# Patient Record
Sex: MALE | Race: ASIAN | NOT HISPANIC OR LATINO | Employment: FULL TIME | URBAN - METROPOLITAN AREA
[De-identification: names, ages, dates, MRNs, and addresses within clinical notes are randomized per-mention and may not be internally consistent; named-entity substitution may affect disease eponyms.]

---

## 2024-10-02 PROBLEM — E53.8 B12 DEFICIENCY: Status: ACTIVE | Noted: 2024-10-02

## 2024-10-02 PROBLEM — E11.9 TYPE 2 DIABETES MELLITUS WITHOUT COMPLICATION, WITHOUT LONG-TERM CURRENT USE OF INSULIN (HCC): Status: ACTIVE | Noted: 2024-10-02

## 2024-10-02 PROBLEM — M25.562 ACUTE PAIN OF LEFT KNEE: Status: ACTIVE | Noted: 2024-10-02

## 2024-10-22 ENCOUNTER — OFFICE VISIT (OUTPATIENT)
Dept: INTERNAL MEDICINE CLINIC | Facility: CLINIC | Age: 54
End: 2024-10-22
Payer: COMMERCIAL

## 2024-10-22 VITALS
OXYGEN SATURATION: 97 % | HEART RATE: 70 BPM | WEIGHT: 176 LBS | SYSTOLIC BLOOD PRESSURE: 116 MMHG | DIASTOLIC BLOOD PRESSURE: 72 MMHG | HEIGHT: 69 IN | BODY MASS INDEX: 26.07 KG/M2 | TEMPERATURE: 98.2 F

## 2024-10-22 DIAGNOSIS — Z23 ENCOUNTER FOR IMMUNIZATION: ICD-10-CM

## 2024-10-22 DIAGNOSIS — Z12.11 SCREENING FOR COLON CANCER: ICD-10-CM

## 2024-10-22 DIAGNOSIS — E53.8 B12 DEFICIENCY: ICD-10-CM

## 2024-10-22 DIAGNOSIS — E78.5 HYPERLIPIDEMIA, UNSPECIFIED HYPERLIPIDEMIA TYPE: ICD-10-CM

## 2024-10-22 DIAGNOSIS — E11.9 TYPE 2 DIABETES MELLITUS WITHOUT COMPLICATION, WITHOUT LONG-TERM CURRENT USE OF INSULIN (HCC): Primary | ICD-10-CM

## 2024-10-22 PROCEDURE — 90673 RIV3 VACCINE NO PRESERV IM: CPT

## 2024-10-22 PROCEDURE — 99214 OFFICE O/P EST MOD 30 MIN: CPT | Performed by: INTERNAL MEDICINE

## 2024-10-22 PROCEDURE — 90471 IMMUNIZATION ADMIN: CPT

## 2024-10-22 NOTE — PROGRESS NOTES
Assessment/Plan:           1. Type 2 diabetes mellitus without complication, without long-term current use of insulin (HCC)  Comments:  Continue metformin 500 mg 2 times daily.  Orders:  -     CBC and differential; Future; Expected date: 04/02/2025  -     Comprehensive metabolic panel; Future; Expected date: 04/02/2025  -     Albumin / creatinine urine ratio; Future; Expected date: 04/02/2025  -     Hemoglobin A1C; Future; Expected date: 04/02/2025  -     Urinalysis with microscopic; Future; Expected date: 04/02/2025  -     metFORMIN (GLUCOPHAGE) 500 mg tablet; Take 1 tablet (500 mg total) by mouth 2 (two) times a day with meals  2. Screening for colon cancer  -     Ambulatory Referral to Gastroenterology; Future  3. Encounter for immunization  -     influenza vaccine, recombinant, PF, 0.5 mL IM (Flublok)  4. B12 deficiency  Comments:  Advised to continue supplementation and increased to 5 times weekly.  5. Hyperlipidemia, unspecified hyperlipidemia type  Comments:  Continue atorvastatin 20 mg daily.  Orders:  -     Lipid panel; Future; Expected date: 04/02/2025         1. Screening for colon cancer    - Ambulatory Referral to Gastroenterology; Future    2. Encounter for immunization    - influenza vaccine, recombinant, PF, 0.5 mL IM (Flublok)       No problem-specific Assessment & Plan notes found for this encounter.           Subjective:      Patient ID: Gordon Joe is a 53 y.o. male.    HPI    The following portions of the patient's history were reviewed and updated as appropriate: He  has a past medical history of Diabetes mellitus (HCC).  He   Patient Active Problem List    Diagnosis Date Noted    Hyperlipidemia 10/02/2024    Type 2 diabetes mellitus without complication, without long-term current use of insulin (HCC) 10/02/2024    Palpitations 10/02/2024    BPH associated with nocturia 10/02/2024    Abnormal EKG 10/02/2024    Acute pain of left knee 10/02/2024    B12 deficiency 10/02/2024     He  has a past  surgical history that includes [place] tooth extraction (Bilateral).  His family history includes Diabetes in his brother, mother, sister, and sister; No Known Problems in his father.  He  reports that he has never smoked. He has never used smokeless tobacco. He reports that he does not drink alcohol and does not use drugs.  Current Outpatient Medications   Medication Sig Dispense Refill    atorvastatin (LIPITOR) 20 mg tablet Take 1 tablet (20 mg total) by mouth daily 90 tablet 1    metFORMIN (GLUCOPHAGE) 500 mg tablet Take 1 tablet (500 mg total) by mouth 2 (two) times a day with meals 180 tablet 1     No current facility-administered medications for this visit.     Current Outpatient Medications on File Prior to Visit   Medication Sig    atorvastatin (LIPITOR) 20 mg tablet Take 1 tablet (20 mg total) by mouth daily    [DISCONTINUED] metFORMIN (GLUCOPHAGE) 500 mg tablet Take 500 mg by mouth 2 (two) times a day with meals     No current facility-administered medications on file prior to visit.     Medications Discontinued During This Encounter   Medication Reason    metFORMIN (GLUCOPHAGE) 500 mg tablet Reorder      He has No Known Allergies..    Review of Systems   Constitutional:  Negative for appetite change, chills, fatigue and fever.   HENT:  Negative for sore throat and trouble swallowing.    Eyes:  Negative for redness.   Respiratory:  Negative for shortness of breath.    Cardiovascular:  Negative for chest pain and palpitations.   Gastrointestinal:  Negative for abdominal pain, constipation and diarrhea.   Genitourinary:  Negative for dysuria and hematuria.   Musculoskeletal:  Negative for back pain and neck pain.   Skin:  Negative for rash.   Neurological:  Negative for seizures, weakness and headaches.   Hematological:  Negative for adenopathy.   Psychiatric/Behavioral:  Negative for confusion. The patient is not nervous/anxious.          Objective:      /72 (BP Location: Left arm, Patient Position:  "Sitting, Cuff Size: Standard)   Pulse 70   Temp 98.2 °F (36.8 °C) (Temporal)   Ht 5' 9\" (1.753 m)   Wt 79.8 kg (176 lb)   SpO2 97%   BMI 25.99 kg/m²     Results Reviewed       None            Recent Results (from the past 1344 hour(s))   Montrellruby Ann Default    Collection Time: 10/03/24 12:24 PM   Result Value Ref Range    White Blood Cell Count 7.6 3.4 - 10.8 x10E3/uL    Red Blood Cell Count 4.70 4.14 - 5.80 x10E6/uL    Hemoglobin 13.8 13.0 - 17.7 g/dL    HCT 43.2 37.5 - 51.0 %    MCV 92 79 - 97 fL    MCH 29.4 26.6 - 33.0 pg    MCHC 31.9 31.5 - 35.7 g/dL    RDW 12.7 11.6 - 15.4 %    Platelet Count 257 150 - 450 x10E3/uL    Neutrophils 57 Not Estab. %    Lymphocytes 32 Not Estab. %    Monocytes 9 Not Estab. %    Eosinophils 1 Not Estab. %    Basophils PCT 1 Not Estab. %    Neutrophils (Absolute) 4.4 1.4 - 7.0 x10E3/uL    Lymphocytes (Absolute) 2.4 0.7 - 3.1 x10E3/uL    Monocytes (Absolute) 0.7 0.1 - 0.9 x10E3/uL    Eosinophils (Absolute) 0.1 0.0 - 0.4 x10E3/uL    Basophils ABS 0.0 0.0 - 0.2 x10E3/uL    Immature Granulocytes 0 Not Estab. %    Immature Granulocytes (Absolute) 0.0 0.0 - 0.1 x10E3/uL   Comprehensive metabolic panel    Collection Time: 10/03/24 12:24 PM   Result Value Ref Range    Glucose, Random 109 (H) 70 - 99 mg/dL    BUN 10 6 - 24 mg/dL    Creatinine 0.67 (L) 0.76 - 1.27 mg/dL    eGFR 110 >59 mL/min/1.73    SL AMB BUN/CREATININE RATIO 15 9 - 20    Sodium 138 134 - 144 mmol/L    Potassium 4.4 3.5 - 5.2 mmol/L    Chloride 102 96 - 106 mmol/L    CO2 23 20 - 29 mmol/L    CALCIUM 9.6 8.7 - 10.2 mg/dL    Protein, Total 7.0 6.0 - 8.5 g/dL    Albumin 4.2 3.8 - 4.9 g/dL    Globulin, Total 2.8 1.5 - 4.5 g/dL    TOTAL BILIRUBIN 1.3 (H) 0.0 - 1.2 mg/dL    Alk Phos Isoenzymes 62 44 - 121 IU/L    AST 21 0 - 40 IU/L    ALT 17 0 - 44 IU/L   Urinalysis with microscopic    Collection Time: 10/03/24 12:24 PM   Result Value Ref Range    Specific Gravity 1.011 1.005 - 1.030    Ph 6.0 5.0 - 7.5    Color UA Yellow " Yellow    Urine Appearance Clear Clear    Leukocyte Esterase Negative Negative    Protein Negative Negative/Trace    Glucose, 24 HR Urine Negative Negative    Ketone, Urine Negative Negative    Blood, Urine Negative Negative    Bilirubin, Urine Negative Negative    Urobilinogen Urine 0.2 0.2 - 1.0 mg/dL    Nitrites Urine Negative Negative    Microscopic Examination Comment     Microscopic Examination See below:    Microscopic Examination    Collection Time: 10/03/24 12:24 PM   Result Value Ref Range    SL AMB WBC, URINE None seen 0 - 5 /hpf    RBC, Urine None seen 0 - 2 /hpf    Epithelial Cells (non renal) None seen 0 - 10 /hpf    Casts None seen None seen /lpf    Bacteria, Urine None seen None seen/Few   Lipid panel    Collection Time: 10/03/24 12:24 PM   Result Value Ref Range    Cholesterol, Total 103 100 - 199 mg/dL    Triglycerides 91 0 - 149 mg/dL    HDL 44 >39 mg/dL    VLDL Cholesterol Calculated 18 5 - 40 mg/dL    LDL Calculated 41 0 - 99 mg/dL   Albumin / creatinine urine ratio    Collection Time: 10/03/24 12:24 PM   Result Value Ref Range    Creatinine, Urine 34.1 Not Estab. mg/dL    Albumin,U,Random <3.0 Not Estab. ug/mL    Microalb/Creat Ratio <9 0 - 29 mg/g creat   Hemoglobin A1c (w/out EAG)    Collection Time: 10/03/24 12:24 PM   Result Value Ref Range    Hemoglobin A1C 6.9 (H) 4.8 - 5.6 %   TSH, 3rd generation    Collection Time: 10/03/24 12:24 PM   Result Value Ref Range    TSH 1.650 0.450 - 4.500 uIU/mL   PSA Total, Diagnostic    Collection Time: 10/03/24 12:24 PM   Result Value Ref Range    Prostate Specific Antigen Total 0.4 0.0 - 4.0 ng/mL   Vitamin D 25 hydroxy    Collection Time: 10/03/24 12:24 PM   Result Value Ref Range    25-HYDROXY VIT D 33.5 30.0 - 100.0 ng/mL   Vitamin B12    Collection Time: 10/03/24 12:24 PM   Result Value Ref Range    Vitamin B-12 442 232 - 1,245 pg/mL   Magnesium    Collection Time: 10/03/24 12:24 PM   Result Value Ref Range    Magnesium, Serum 2.1 1.6 - 2.3 mg/dL         Physical Exam  Constitutional:       General: He is not in acute distress.     Appearance: Normal appearance.   HENT:      Head: Normocephalic and atraumatic.      Nose: Nose normal.      Mouth/Throat:      Mouth: Mucous membranes are moist.   Eyes:      Extraocular Movements: Extraocular movements intact.      Pupils: Pupils are equal, round, and reactive to light.   Cardiovascular:      Rate and Rhythm: Normal rate and regular rhythm.      Pulses: Normal pulses.      Heart sounds: Normal heart sounds. No murmur heard.     No friction rub.   Pulmonary:      Effort: Pulmonary effort is normal. No respiratory distress.      Breath sounds: Normal breath sounds. No wheezing.   Abdominal:      General: Abdomen is flat. Bowel sounds are normal. There is no distension.      Palpations: Abdomen is soft. There is no mass.      Tenderness: There is no abdominal tenderness. There is no guarding.   Musculoskeletal:         General: Normal range of motion.      Cervical back: Normal range of motion and neck supple.   Skin:     General: Skin is warm.   Neurological:      General: No focal deficit present.      Mental Status: He is alert and oriented to person, place, and time. Mental status is at baseline.      Cranial Nerves: No cranial nerve deficit.   Psychiatric:         Mood and Affect: Mood normal.         Behavior: Behavior normal.

## 2024-10-28 ENCOUNTER — HOSPITAL ENCOUNTER (OUTPATIENT)
Dept: NON INVASIVE DIAGNOSTICS | Age: 54
Discharge: HOME | End: 2024-10-28
Payer: COMMERCIAL

## 2024-10-28 DIAGNOSIS — R94.31 ABNORMAL EKG: ICD-10-CM

## 2024-10-28 LAB
MAX HR PERCENT: 104 %
MAX HR: 173 BPM
RATE PRESSURE PRODUCT: NORMAL
SL CV STRESS STAGE REACHED: 3
STRESS ANGINA INDEX: 0
STRESS BASELINE HR: 85 BPM
STRESS PEAK HR: 173 BPM
STRESS POST ESTIMATED WORKLOAD: 10.1 METS
STRESS POST EXERCISE DUR MIN: 7 MIN
STRESS POST EXERCISE DUR SEC: 23 SEC
STRESS POST PEAK BP: 172 MMHG

## 2024-10-28 PROCEDURE — 93017 CV STRESS TEST TRACING ONLY: CPT

## 2024-10-28 PROCEDURE — 93018 CV STRESS TEST I&R ONLY: CPT | Performed by: INTERNAL MEDICINE

## 2024-10-28 PROCEDURE — 93016 CV STRESS TEST SUPVJ ONLY: CPT | Performed by: INTERNAL MEDICINE

## 2024-10-29 LAB
CHEST PAIN STATEMENT: NORMAL
MAX DIASTOLIC BP: 80 MMHG
MAX PREDICTED HEART RATE: 165 BPM
PROTOCOL NAME: NORMAL
REASON FOR TERMINATION: NORMAL
STRESS POST EXERCISE DUR MIN: 7 MIN
STRESS POST EXERCISE DUR SEC: 23 SEC
STRESS POST PEAK HR: 173 BPM
STRESS POST PEAK SYSTOLIC BP: 172 MMHG
TARGET HR FORMULA: NORMAL
TEST INDICATION: NORMAL

## 2024-12-05 ENCOUNTER — CONSULT (OUTPATIENT)
Dept: CARDIOLOGY CLINIC | Facility: CLINIC | Age: 54
End: 2024-12-05
Payer: COMMERCIAL

## 2024-12-05 VITALS
DIASTOLIC BLOOD PRESSURE: 70 MMHG | OXYGEN SATURATION: 100 % | SYSTOLIC BLOOD PRESSURE: 120 MMHG | HEART RATE: 92 BPM | HEIGHT: 69 IN | WEIGHT: 173 LBS | BODY MASS INDEX: 25.62 KG/M2

## 2024-12-05 DIAGNOSIS — E11.9 TYPE 2 DIABETES MELLITUS WITHOUT COMPLICATION, WITHOUT LONG-TERM CURRENT USE OF INSULIN (HCC): ICD-10-CM

## 2024-12-05 DIAGNOSIS — R94.39 EQUIVOCAL STRESS TEST: ICD-10-CM

## 2024-12-05 DIAGNOSIS — R00.2 PALPITATIONS: ICD-10-CM

## 2024-12-05 DIAGNOSIS — E78.5 HYPERLIPIDEMIA, UNSPECIFIED HYPERLIPIDEMIA TYPE: Primary | ICD-10-CM

## 2024-12-05 DIAGNOSIS — R94.31 ABNORMAL EKG: ICD-10-CM

## 2024-12-05 PROCEDURE — 99204 OFFICE O/P NEW MOD 45 MIN: CPT | Performed by: INTERNAL MEDICINE

## 2024-12-05 RX ORDER — LANOLIN ALCOHOL/MO/W.PET/CERES
1000 CREAM (GRAM) TOPICAL DAILY
COMMUNITY

## 2024-12-05 NOTE — PROGRESS NOTES
Cardiology Consultation     Heath Leyva  [id number]  1970  HEART & VASCULAR [facility]  [address] [place] [id number]      Diagnoses and all orders for this visit:    Hyperlipidemia, unspecified hyperlipidemia type    Palpitations  Comments:  EKG did not show any changes from previous.  Orders:  -     Ambulatory Referral to Cardiology  -     AMB extended holter monitor; Future    Abnormal EKG  Comments:  Stress test and cardiology referral ordered.  Orders:  -     Ambulatory Referral to Cardiology    Type 2 diabetes mellitus without complication, without long-term current use of insulin (HCC)    Equivocal stress test  -     Cancel: NM myocardial perfusion spect (stress and/or rest); Future  -     NM myocardial perfusion spect (stress and/or rest); Future    Other orders  -     vitamin B-12 (VITAMIN B-12) 1,000 mcg tablet; Take 1,000 mcg by mouth daily        I had the pleasure of seeing Heath Leyva for a consultation regarding palpitations requested by     History of the Presenting Illness, Discussion/Summary and my Plan are as follows:::     is a pleasant 54-year-old gentleman with a history of diabetes for last 4 years, on statin for that, no history of hypertension, no tobacco use, no alcohol use who is here for further evaluation of palpitations.    Family history-mother has diabetes and had a heart attack at 65, father and siblings are well.    Activity wise he walks for 30 to 45 minutes covering about 2 miles without any limitations or symptoms.  He works at [employer].    He is mainly here for palpitations-originally occurred in October 2023 when he sought attention at an ER in June with a negative evaluation and after that subsided and more recently recurred and hence is seeking attention.  He feels there is a strong beat, not sustained, not tachycardic, no clear precipitating  or relieving factors.  Drinks 1 cup of coffee in the morning and about 3 L of water a day.  No other caffeinated products.    Cardiac exam is unremarkable.  Recent ECG showed sinus rhythm with nonspecific T wave changes.  This led to a stress test that was equivocal which I personally reviewed, with no diagnostic ECG changes suggestive of ischemia.    Plan:    Palpitations: Considering that his palpitations are random, without a specific frequency, will start with a cardiac rhythm monitor  Recent TSH was normal  Adequate hydration by history and minimal caffeine intake and no alcohol  Normal cardiac exam and unremarkable ECG overall, hence no echo for now    Equivocal stress test: Overall no changes suggestive of ischemia but with the results, offered a nuclear stress test versus coronary CTA.  He would like to do a nuclear stress test on a routine basis next year.    Diabetes: Well-controlled    Lipids are also well-controlled    Follow-up in 3 months       Latest Reference Range & Units 10/03/24 12:24   Cholesterol 100 - 199 mg/dL 103   Triglycerides 0 - 149 mg/dL 91   HDL >39 mg/dL 44   LDL Calculated 0 - 99 mg/dL 41   VLDL Cholesterol Fam 5 - 40 mg/dL 18      Latest Reference Range & Units 10/03/24 12:24   Hemoglobin A1C 4.8 - 5.6 % 6.9 (H)   (H): Data is abnormally high   Latest Reference Range & Units 10/03/24 12:24   TSH, POC 0.450 - 4.500 uIU/mL 1.650       Oct 2023  Ref Range & Units 1 yr ago   TSH  0.27 - 4.20 mU/L 1.82     1. Hyperlipidemia, unspecified hyperlipidemia type        2. Palpitations  Ambulatory Referral to Cardiology    AMB extended holter monitor    EKG did not show any changes from previous.      3. Abnormal EKG  Ambulatory Referral to Cardiology    Stress test and cardiology referral ordered.      4. Type 2 diabetes mellitus without complication, without long-term current use of insulin ([facility])        5. Equivocal stress test  NM myocardial perfusion spect (stress and/or rest)    CANCELED:  NM myocardial perfusion spect (stress and/or rest)        Patient Active Problem List   Diagnosis    Hyperlipidemia    Type 2 diabetes mellitus without complication, without long-term current use of insulin (HCC)    Palpitations    BPH associated with nocturia    Abnormal EKG    Acute pain of left knee    B12 deficiency    Equivocal stress test     Past Medical History:   Diagnosis Date    Diabetes mellitus (HCC)      Social History     Socioeconomic History    Marital status: /Civil Union     Spouse name: Not on file    Number of children: Not on file    Years of education: Not on file    Highest education level: Not on file   Occupational History    Not on file   Tobacco Use    Smoking status: Never    Smokeless tobacco: Never   Vaping Use    Vaping status: Never Used   Substance and Sexual Activity    Alcohol use: Never    Drug use: Never    Sexual activity: Yes     Partners: Female   Other Topics Concern    Not on file   Social History Narrative    Not on file     Social Drivers of Health     Financial Resource Strain: Not on file   Food Insecurity: Low Risk  (8/17/2022)    Received from [facility] Food Insecurity     Within the past 12 months, you worried that your food would run out before you got money to buy more.: Never true   Transportation Needs: Low Risk  (8/17/2022)    Received from [facility] Transportation Needs     In the past 12 months, has lack of transportation kept you from meetings, work, or getting things needed for daily living?: No   Physical Activity: Not on file   Stress: Not on file   Social Connections: Not on file   Intimate Partner Violence: Not on file   Housing Stability: Not on file      Family History   Problem Relation Age of Onset    Diabetes Mother     No Known Problems Father     Diabetes Sister     Diabetes Sister     Diabetes Brother      Past Surgical History:   Procedure Laterality Date    WISDOM TOOTH EXTRACTION Bilateral  "       Current Outpatient Medications:     atorvastatin (LIPITOR) 20 mg tablet, Take 1 tablet (20 mg total) by mouth daily, Disp: 90 tablet, Rfl: 1    metFORMIN (GLUCOPHAGE) 500 mg tablet, Take 1 tablet (500 mg total) by mouth 2 (two) times a day with meals, Disp: 180 tablet, Rfl: 1    vitamin B-12 (VITAMIN B-12) 1,000 mcg tablet, Take 1,000 mcg by mouth daily, Disp: , Rfl:   Allergies   Allergen Reactions    Ace Inhibitors Cough     Vitals:    12/05/24 0831   BP: 120/70   BP Location: Right arm   Patient Position: Sitting   Cuff Size: Standard   Pulse: 92   SpO2: 100%   Weight: 78.5 kg (173 lb)   Height: 5' 9\" (1.753 m)         Imaging: No results found.    Review of Systems:  Review of Systems   Constitutional: Negative.    HENT: Negative.     Eyes: Negative.    Respiratory: Negative.     Cardiovascular:  Positive for palpitations. Negative for chest pain and leg swelling.   Endocrine: Negative.    Musculoskeletal: Negative.        Physical Exam:    /70 (BP Location: Right arm, Patient Position: Sitting, Cuff Size: Standard)   Pulse 92   Ht 5' 9\" (1.753 m)   Wt 78.5 kg (173 lb)   SpO2 100%   BMI 25.55 kg/m²   Physical Exam  Constitutional:       General: He is not in acute distress.     Appearance: He is not ill-appearing, toxic-appearing or diaphoretic.   HENT:      Head: Normocephalic.      Nose: Nose normal. No congestion or rhinorrhea.      Mouth/Throat:      Mouth: Mucous membranes are moist.      Pharynx: No oropharyngeal exudate or posterior oropharyngeal erythema.   Neck:      Vascular: No carotid bruit.   Cardiovascular:      Pulses: Normal pulses.      Heart sounds: No murmur heard.     No friction rub. No gallop.   Pulmonary:      Effort: Pulmonary effort is normal. No respiratory distress.      Breath sounds: No stridor. No wheezing or rhonchi.   Musculoskeletal:         General: No swelling, tenderness, deformity or signs of injury. Normal range of motion.      Cervical back: Normal range " "of motion. No rigidity or tenderness.   Lymphadenopathy:      Cervical: No cervical adenopathy.   Neurological:      Mental Status: He is alert.            This note was completed in part utilizing [employer] direct voice recognition software.   Grammatical errors, random word insertion, spelling mistakes, occasional wrong word or \"sound-alike\" substitutions and incomplete sentences may be an occasional consequence of the system secondary to software limitations, ambient noise and hardware issues. At the time of dictation, efforts were made to edit, clarify and /or correct errors.  Please read the chart carefully and recognize, using context, where substitutions have occurred.  If you have any questions or concerns about the context, text or information contained within the body of this dictation, please contact myself, the provider, for further clarification.  "

## 2024-12-09 ENCOUNTER — TELEPHONE (OUTPATIENT)
Dept: CARDIOLOGY CLINIC | Facility: CLINIC | Age: 54
End: 2024-12-09

## 2024-12-09 NOTE — TELEPHONE ENCOUNTER
Per list of insurances, no prior auth is required for [employer] insurance. Cpt code 44498    2 week zio ordered.

## 2024-12-26 ENCOUNTER — TELEPHONE (OUTPATIENT)
Dept: ADMINISTRATIVE | Facility: OTHER | Age: 54
End: 2024-12-26

## 2024-12-26 NOTE — LETTER
Diabetic Eye Exam Form    Date Requested: 24  Patient: Heath Leyva  Patient : 1970   Referring Provider: Navdeep Khanna MD      DIABETIC Eye Exam Date _______________________________      Type of Exam MUST be documented for Diabetic Eye Exams. Please CHECK ONE.     Retinal Exam       Dilated Retinal Exam       OCT       Optomap-Iris Exam      Fundus Photography       Left Eye - Please check Retinopathy or No Retinopathy        Exam did show retinopathy    Exam did not show retinopathy       Right Eye - Please check Retinopathy or No Retinopathy       Exam did show retinopathy    Exam did not show retinopathy       Comments __________________________________________________________    Practice Providing Exam ______________________________________________    Exam Performed By (print name) _______________________________________      Provider Signature ___________________________________________________      These reports are needed for  compliance.  Please fax this completed form and a copy of the Diabetic Eye Exam report to our office located at [address] [place] [address] as soon as possible via Fax [phone] attention Shante: Phone [phone]  We thank you for your assistance in treating our mutual patient.

## 2024-12-26 NOTE — LETTER
Diabetic Eye Exam Form  Second Request    Date Requested: 24  Patient: Heath Leyva  Patient : 1970   Referring Provider: Navdeep Khanna MD      DIABETIC Eye Exam Date _______________________________      Type of Exam MUST be documented for Diabetic Eye Exams. Please CHECK ONE.     Retinal Exam       Dilated Retinal Exam       OCT       Optomap-Iris Exam      Fundus Photography       Left Eye - Please check Retinopathy or No Retinopathy        Exam did show retinopathy    Exam did not show retinopathy       Right Eye - Please check Retinopathy or No Retinopathy       Exam did show retinopathy    Exam did not show retinopathy       Comments __________________________________________________________    Practice Providing Exam ______________________________________________    Exam Performed By (print name) _______________________________________      Provider Signature ___________________________________________________      These reports are needed for  compliance.  Please fax this completed form and a copy of the Diabetic Eye Exam report to our office located at [address] [place] [address] as soon as possible via Fax [phone] attention Shante: Phone [phone]  We thank you for your assistance in treating our mutual patient.

## 2024-12-26 NOTE — TELEPHONE ENCOUNTER
----- Message from Lupillo PAZ sent at 12/24/2024 12:38 PM EST -----  12/24/24 12:38 PM    Hello, our patient Heath Leyva has had Diabetic Eye Exam completed/performed. Please assist in updating the patient chart by making an External outreach to [facility] facility located in [address] [place] [address]. The date of service is 12/24/2024.    Thank you,  Lupillo Adler  [facility] [place]

## 2024-12-27 NOTE — TELEPHONE ENCOUNTER
Upon review of the In Basket request and the patient's chart, initial outreach has been made via fax to facility. Please see Contacts section for details.     Thank you  Shante Crow

## 2024-12-31 NOTE — TELEPHONE ENCOUNTER
As a follow-up, a second attempt has been made for outreach via fax to facility. Please see Contacts section for details.    Thank you  Shante Crow

## 2025-01-02 ENCOUNTER — HOSPITAL ENCOUNTER (OUTPATIENT)
Dept: NON INVASIVE DIAGNOSTICS | Facility: CLINIC | Age: 55
Discharge: HOME | End: 2025-01-02
Attending: INTERNAL MEDICINE
Payer: COMMERCIAL

## 2025-01-02 ENCOUNTER — RESULTS FOLLOW-UP (OUTPATIENT)
Dept: NON INVASIVE DIAGNOSTICS | Facility: HOSPITAL | Age: 55
End: 2025-01-02

## 2025-01-02 VITALS
BODY MASS INDEX: 25.62 KG/M2 | HEIGHT: 69 IN | DIASTOLIC BLOOD PRESSURE: 72 MMHG | SYSTOLIC BLOOD PRESSURE: 136 MMHG | WEIGHT: 173 LBS

## 2025-01-02 DIAGNOSIS — R94.39 EQUIVOCAL STRESS TEST: ICD-10-CM

## 2025-01-02 LAB
CHEST PAIN STATEMENT: NORMAL
MAX DIASTOLIC BP: 64 MMHG
MAX HR PERCENT: 103 %
MAX HR: 169 BPM
MAX PREDICTED HEART RATE: 164 BPM
NUC STRESS EJECTION FRACTION: 75 %
PROTOCOL NAME: NORMAL
RATE PRESSURE PRODUCT: NORMAL
REASON FOR TERMINATION: NORMAL
SL CV REST NUCLEAR ISOTOPE DOSE: 10.4 MCI
SL CV STRESS NUCLEAR ISOTOPE DOSE: 32.6 MCI
SL CV STRESS RECOVERY BP: NORMAL MMHG
SL CV STRESS RECOVERY HR: 107 BPM
SL CV STRESS RECOVERY O2 SAT: 99 %
SL CV STRESS STAGE REACHED: 3
STRESS ANGINA INDEX: 0
STRESS BASELINE BP: NORMAL MMHG
STRESS BASELINE HR: 81 BPM
STRESS O2 SAT REST: 99 %
STRESS PEAK HR: 169 BPM
STRESS POST ESTIMATED WORKLOAD: 10.1 METS
STRESS POST EXERCISE DUR MIN: 7 MIN
STRESS POST EXERCISE DUR MIN: 7 MIN
STRESS POST EXERCISE DUR SEC: 32 SEC
STRESS POST EXERCISE DUR SEC: 32 SEC
STRESS POST O2 SAT PEAK: 99 %
STRESS POST PEAK BP: 166 MMHG
STRESS POST PEAK HR: 169 BPM
STRESS POST PEAK SYSTOLIC BP: 166 MMHG
STRESS/REST PERFUSION RATIO: 0.78
TARGET HR FORMULA: NORMAL
TEST INDICATION: NORMAL

## 2025-01-02 PROCEDURE — 93018 CV STRESS TEST I&R ONLY: CPT | Performed by: INTERNAL MEDICINE

## 2025-01-02 PROCEDURE — 93016 CV STRESS TEST SUPVJ ONLY: CPT | Performed by: INTERNAL MEDICINE

## 2025-01-02 PROCEDURE — A9502 TC99M TETROFOSMIN: HCPCS

## 2025-01-02 PROCEDURE — 93017 CV STRESS TEST TRACING ONLY: CPT

## 2025-01-02 PROCEDURE — 78452 HT MUSCLE IMAGE SPECT MULT: CPT | Performed by: INTERNAL MEDICINE

## 2025-01-02 PROCEDURE — 78452 HT MUSCLE IMAGE SPECT MULT: CPT

## 2025-01-02 NOTE — TELEPHONE ENCOUNTER
As a final attempt, a third outreach has been made via telephone call to facility. Please see Contacts section for details. This encounter will be closed and completed by end of day. Should we receive the requested information because of previous outreach attempts, the requested patient's chart will be updated appropriately.     Thank you  Shante Crow

## 2025-01-13 LAB
LEFT EYE DIABETIC RETINOPATHY: NORMAL
RIGHT EYE DIABETIC RETINOPATHY: NORMAL
SEVERITY (EYE EXAM): NORMAL

## 2025-01-20 ENCOUNTER — TELEPHONE (OUTPATIENT)
Dept: ADMINISTRATIVE | Facility: OTHER | Age: 55
End: 2025-01-20

## 2025-01-20 NOTE — TELEPHONE ENCOUNTER
----- Message from Lupillo PAZ sent at 1/19/2025  6:16 PM EST -----  01/19/25 6:16 PM    SENDING A FOLLOW UP MESSAGE AS A RESULT OF INITIAL [facility]ARE[facility] FINDINGS:    Shante Tristin to [facility]   JFLAVIO      1/2/25  1:13 PM  Patient only had routine eye exam last visit. patient is scheduled 1/13/24        Hello, our patient Heath Leyva has had Diabetic Eye Exam completed/performed. Please assist in updating the patient chart by making an External outreach to [facility] facility located in [address] [place] [address]. The date of service is 1/13/2025.    Thank you,  Lupillo Adler [facility] [place]

## 2025-01-20 NOTE — LETTER
Diabetic Eye Exam Form    Date Requested: 25  Patient: Heath Leyva  Patient : 1970   Referring Provider: Navdeep Khanna MD      DIABETIC Eye Exam Date _______________________________      Type of Exam MUST be documented for Diabetic Eye Exams. Please CHECK ONE.     Retinal Exam       Dilated Retinal Exam       OCT       Optomap-Iris Exam      Fundus Photography       Left Eye - Please check Retinopathy or No Retinopathy        Exam did show retinopathy    Exam did not show retinopathy       Right Eye - Please check Retinopathy or No Retinopathy       Exam did show retinopathy    Exam did not show retinopathy       Comments __________________________________________________________    Practice Providing Exam ______________________________________________    Exam Performed By (print name) _______________________________________      Provider Signature ___________________________________________________      These reports are needed for  compliance.  Please fax this completed form and a copy of the Diabetic Eye Exam report to our office located at [address] [place] [address] as soon as possible via Fax [phone] attention Tanya: Phone [phone]  We thank you for your assistance in treating our mutual patient.

## 2025-01-20 NOTE — TELEPHONE ENCOUNTER
Upon review of the In Basket request and the patient's chart, initial outreach has been made via fax to facility. Please see Contacts section for details.     Thank you  Tanya Ashley MA

## 2025-01-24 NOTE — TELEPHONE ENCOUNTER
As a follow-up, a second attempt has been made for outreach via fax to facility. Please see Contacts section for details.    Thank you  Tanya Ashley MA

## 2025-01-27 ENCOUNTER — CLINICAL SUPPORT (OUTPATIENT)
Dept: CARDIOLOGY CLINIC | Facility: CLINIC | Age: 55
End: 2025-01-27
Payer: COMMERCIAL

## 2025-01-27 DIAGNOSIS — R00.2 PALPITATIONS: ICD-10-CM

## 2025-01-27 PROCEDURE — 93248 EXT ECG>7D<15D REV&INTERPJ: CPT | Performed by: INTERNAL MEDICINE

## 2025-01-28 ENCOUNTER — EVENT RECORDER/EXTENDED HOLTER (OUTPATIENT)
Dept: CARDIOLOGY CLINIC | Facility: CLINIC | Age: 55
End: 2025-01-28

## 2025-01-28 NOTE — PROGRESS NOTES
Results of Cardiac Rhythm Monitor - Zio patch      Duration of monitoring - 13 days January 2024      Background data:  Patient had a min HR of 51 bpm, max HR of 161 bpm, and avg HR of 76 bpm.   Predominant underlying rhythm was Sinus Rhythm.     Supraventricular ectopy:  Isolated SVEs were rare (<1.0%), and no SVE Couplets or SVE Triplets were present.     Ventricular ectopy:  Isolated VEs were rare (<1.0%), VE Couplets were rare (<1.0%), and no VE Triplets were present.     Symptoms:  Multiple symptom episodes were reported-all of them consistently correlating with isolated PVCs    Impression:     Overall unremarkable 13 days of cardiac rhythm monitoring  Overall very low burden of supraventricular ectopy and ventricular ectopy  No significant arrhythmias.  Symptoms consistently correlated with isolated PVCs.    Plan:  No change to management

## 2025-01-31 NOTE — TELEPHONE ENCOUNTER
As a final attempt, a third outreach has been made via fax to facility. Please see Contacts section for details. This encounter will be closed and completed by end of day. Should we receive the requested information because of previous outreach attempts, the requested patient's chart will be updated appropriately.     Thank you  Tanya Ashley MA

## 2025-02-03 NOTE — TELEPHONE ENCOUNTER
As a final attempt, a third outreach has been made via fax to facility. Please see Contacts section for details. This encounter will be closed and completed by end of day. Should we receive the requested information because of previous outreach attempts, the requested patient's chart will be updated appropriately.     LMOM    Thank you  Tanya Ashley MA

## 2025-02-06 NOTE — TELEPHONE ENCOUNTER
As a final attempt, a third outreach has been made via telephone call to facility. Please see Contacts section for details. This encounter will be closed and completed by end of day. Should we receive the requested information because of previous outreach attempts, the requested patient's chart will be updated appropriately.     I was able to speak with office today - they will send out report tomorrow - Medical records is out sick today    Thank you  Tanya Ashley MA

## 2025-02-07 NOTE — TELEPHONE ENCOUNTER
Upon review of the In Basket request we were able to locate, review, and update the patient chart as requested for Diabetic Eye Exam.    Any additional questions or concerns should be emailed to the Practice Liaisons via the appropriate education email address, please do not reply via In Basket.    Thank you  Tanya Ashley MA   [facility]

## 2025-04-08 DIAGNOSIS — E78.2 MIXED HYPERLIPIDEMIA: ICD-10-CM

## 2025-04-09 RX ORDER — ATORVASTATIN CALCIUM 20 MG/1
20 TABLET, FILM COATED ORAL DAILY
Qty: 90 TABLET | Refills: 1 | Status: SHIPPED | OUTPATIENT
Start: 2025-04-09

## 2025-04-18 LAB
ALBUMIN SERPL-MCNC: 4.5 G/DL (ref 3.8–4.9)
ALBUMIN/CREAT UR: <4 MG/G CREAT (ref 0–29)
ALP SERPL-CCNC: 64 IU/L (ref 44–121)
ALT SERPL-CCNC: 16 IU/L (ref 0–44)
APPEARANCE UR: CLEAR
AST SERPL-CCNC: 23 IU/L (ref 0–40)
BACTERIA URNS QL MICRO: NORMAL
BASOPHILS # BLD AUTO: 0 X10E3/UL (ref 0–0.2)
BASOPHILS NFR BLD AUTO: 1 %
BILIRUB SERPL-MCNC: 1.1 MG/DL (ref 0–1.2)
BILIRUB UR QL STRIP: NEGATIVE
BUN SERPL-MCNC: 14 MG/DL (ref 6–24)
BUN/CREAT SERPL: 20 (ref 9–20)
CALCIUM SERPL-MCNC: 9.4 MG/DL (ref 8.7–10.2)
CASTS URNS QL MICRO: NORMAL /LPF
CHLORIDE SERPL-SCNC: 102 MMOL/L (ref 96–106)
CHOLEST SERPL-MCNC: 94 MG/DL (ref 100–199)
CO2 SERPL-SCNC: 18 MMOL/L (ref 20–29)
COLOR UR: YELLOW
CREAT SERPL-MCNC: 0.7 MG/DL (ref 0.76–1.27)
CREAT UR-MCNC: 83.3 MG/DL
EGFR: 108 ML/MIN/1.73
EOSINOPHIL # BLD AUTO: 0.2 X10E3/UL (ref 0–0.4)
EOSINOPHIL NFR BLD AUTO: 3 %
EPI CELLS #/AREA URNS HPF: NORMAL /HPF (ref 0–10)
ERYTHROCYTE [DISTWIDTH] IN BLOOD BY AUTOMATED COUNT: 12.5 % (ref 11.6–15.4)
GLOBULIN SER-MCNC: 2.6 G/DL (ref 1.5–4.5)
GLUCOSE SERPL-MCNC: 106 MG/DL (ref 70–99)
GLUCOSE UR QL: NEGATIVE
HBA1C MFR BLD: 6.5 % (ref 4.8–5.6)
HCT VFR BLD AUTO: 41.5 % (ref 37.5–51)
HDLC SERPL-MCNC: 38 MG/DL
HGB BLD-MCNC: 14.2 G/DL (ref 13–17.7)
HGB UR QL STRIP: NEGATIVE
IMM GRANULOCYTES # BLD: 0 X10E3/UL (ref 0–0.1)
IMM GRANULOCYTES NFR BLD: 0 %
KETONES UR QL STRIP: NEGATIVE
LDLC SERPL CALC-MCNC: 37 MG/DL (ref 0–99)
LEUKOCYTE ESTERASE UR QL STRIP: NEGATIVE
LYMPHOCYTES # BLD AUTO: 1.9 X10E3/UL (ref 0.7–3.1)
LYMPHOCYTES NFR BLD AUTO: 36 %
MCH RBC QN AUTO: 31 PG (ref 26.6–33)
MCHC RBC AUTO-ENTMCNC: 34.2 G/DL (ref 31.5–35.7)
MCV RBC AUTO: 91 FL (ref 79–97)
MICRO URNS: NORMAL
MICRO URNS: NORMAL
MICROALBUMIN UR-MCNC: <3 UG/ML
MONOCYTES # BLD AUTO: 0.6 X10E3/UL (ref 0.1–0.9)
MONOCYTES NFR BLD AUTO: 11 %
NEUTROPHILS # BLD AUTO: 2.7 X10E3/UL (ref 1.4–7)
NEUTROPHILS NFR BLD AUTO: 49 %
NITRITE UR QL STRIP: NEGATIVE
PH UR STRIP: 5.5 [PH] (ref 5–7.5)
PLATELET # BLD AUTO: 262 X10E3/UL (ref 150–450)
POTASSIUM SERPL-SCNC: 4.9 MMOL/L (ref 3.5–5.2)
PROT SERPL-MCNC: 7.1 G/DL (ref 6–8.5)
PROT UR QL STRIP: NEGATIVE
RBC # BLD AUTO: 4.58 X10E6/UL (ref 4.14–5.8)
RBC #/AREA URNS HPF: NORMAL /HPF (ref 0–2)
SL AMB VLDL CHOLESTEROL CALC: 19 MG/DL (ref 5–40)
SODIUM SERPL-SCNC: 138 MMOL/L (ref 134–144)
SP GR UR: 1.02 (ref 1–1.03)
TRIGL SERPL-MCNC: 97 MG/DL (ref 0–149)
UROBILINOGEN UR STRIP-ACNC: 0.2 MG/DL (ref 0.2–1)
WBC # BLD AUTO: 5.4 X10E3/UL (ref 3.4–10.8)
WBC #/AREA URNS HPF: NORMAL /HPF (ref 0–5)

## 2025-04-22 ENCOUNTER — OFFICE VISIT (OUTPATIENT)
Dept: INTERNAL MEDICINE CLINIC | Facility: CLINIC | Age: 55
End: 2025-04-22
Payer: COMMERCIAL

## 2025-04-22 VITALS
DIASTOLIC BLOOD PRESSURE: 60 MMHG | BODY MASS INDEX: 25.59 KG/M2 | TEMPERATURE: 98.6 F | OXYGEN SATURATION: 98 % | WEIGHT: 172.8 LBS | HEART RATE: 67 BPM | HEIGHT: 69 IN | SYSTOLIC BLOOD PRESSURE: 102 MMHG

## 2025-04-22 DIAGNOSIS — E11.9 TYPE 2 DIABETES MELLITUS WITHOUT COMPLICATION, WITHOUT LONG-TERM CURRENT USE OF INSULIN (HCC): ICD-10-CM

## 2025-04-22 DIAGNOSIS — Z12.11 SCREENING FOR COLORECTAL CANCER: Primary | ICD-10-CM

## 2025-04-22 DIAGNOSIS — M25.561 CHRONIC PAIN OF RIGHT KNEE: ICD-10-CM

## 2025-04-22 DIAGNOSIS — G89.29 CHRONIC PAIN OF RIGHT KNEE: ICD-10-CM

## 2025-04-22 DIAGNOSIS — E53.8 B12 DEFICIENCY: ICD-10-CM

## 2025-04-22 DIAGNOSIS — Z12.12 SCREENING FOR COLORECTAL CANCER: Primary | ICD-10-CM

## 2025-04-22 PROCEDURE — 99214 OFFICE O/P EST MOD 30 MIN: CPT | Performed by: INTERNAL MEDICINE

## 2025-04-22 NOTE — PROGRESS NOTES
Assessment/Plan:           1. Screening for colorectal cancer  -     Ambulatory Referral to Colorectal Surgery; Future  2. Type 2 diabetes mellitus without complication, without long-term current use of insulin (HCC)  Comments:  Continue metformin 500 mg daily.  Orders:  -     Albumin / creatinine urine ratio; Future; Expected date: 10/06/2025  -     Lipid panel; Future; Expected date: 10/06/2025  -     Comprehensive metabolic panel; Future; Expected date: 10/06/2025  -     Hemoglobin A1C; Future; Expected date: 10/06/2025  3. Chronic pain of right knee  -     Diclofenac Sodium (VOLTAREN) 1 %; Apply 2 g topically 4 (four) times a day  4. B12 deficiency  Comments:  Continue B12 supplementation.         1. Screening for colorectal cancer (Primary)      2. Type 2 diabetes mellitus without complication, without long-term current use of insulin (HCC)         No problem-specific Assessment & Plan notes found for this encounter.           Subjective:      Patient ID: Heath Leyva is a 54 y.o. male.    HPI    The following portions of the patient's history were reviewed and updated as appropriate: He  has a past medical history of Diabetes mellitus (Formerly [facility]).  He   Patient Active Problem List    Diagnosis Date Noted    Equivocal stress test 12/05/2024    Hyperlipidemia 10/02/2024    Type 2 diabetes mellitus without complication, without long-term current use of insulin (HCC) 10/02/2024    Palpitations 10/02/2024    BPH associated with nocturia 10/02/2024    Abnormal EKG 10/02/2024    Acute pain of left knee 10/02/2024    B12 deficiency 10/02/2024     He  has a past surgical history that includes [place] tooth extraction (Bilateral).  His family history includes Diabetes in his brother, mother, sister, and sister; No Known Problems in his father.  He  reports that he has never smoked. He has never used smokeless tobacco. He reports that he does not drink alcohol and does not use drugs.  Current Outpatient Medications  "  Medication Sig Dispense Refill    atorvastatin (LIPITOR) 20 mg tablet TAKE 1 TABLET BY MOUTH EVERY DAY 90 tablet 1    Diclofenac Sodium (VOLTAREN) 1 % Apply 2 g topically 4 (four) times a day 100 g 1    vitamin B-12 (VITAMIN B-12) 1,000 mcg tablet Take 1,000 mcg by mouth daily      metFORMIN (GLUCOPHAGE) 500 mg tablet TAKE 1 TABLET BY MOUTH TWICE A DAY WITH MEALS 180 tablet 1     No current facility-administered medications for this visit.     Current Outpatient Medications on File Prior to Visit   Medication Sig    atorvastatin (LIPITOR) 20 mg tablet TAKE 1 TABLET BY MOUTH EVERY DAY    vitamin B-12 (VITAMIN B-12) 1,000 mcg tablet Take 1,000 mcg by mouth daily     No current facility-administered medications on file prior to visit.     There are no discontinued medications.   He is allergic to ace inhibitors..    Review of Systems   Constitutional:  Negative for appetite change, chills, fatigue and fever.   HENT:  Negative for sore throat and trouble swallowing.    Eyes:  Negative for redness.   Respiratory:  Negative for shortness of breath.    Cardiovascular:  Negative for chest pain and palpitations.   Gastrointestinal:  Negative for abdominal pain, constipation and diarrhea.   Genitourinary:  Negative for dysuria and hematuria.   Musculoskeletal:  Positive for arthralgias. Negative for back pain and neck pain.   Skin:  Negative for rash.   Neurological:  Negative for seizures, weakness and headaches.   Hematological:  Negative for adenopathy.   Psychiatric/Behavioral:  Negative for confusion. The patient is not nervous/anxious.          Objective:      /60 (BP Location: Left arm, Patient Position: Sitting, Cuff Size: Adult)   Pulse 67   Temp 98.6 °F (37 °C) (Temporal)   Ht 5' 9\" (1.753 m)   Wt 78.4 kg (172 lb 12.8 oz)   SpO2 98%   BMI 25.52 kg/m²     Results Reviewed       None            Recent Results (from the past 8 weeks)   CBC and differential    Collection Time: 04/12/25  7:56 AM   Result " Value Ref Range    White Blood Cell Count 5.4 3.4 - 10.8 x10E3/uL    Red Blood Cell Count 4.58 4.14 - 5.80 x10E6/uL    Hemoglobin 14.2 13.0 - 17.7 g/dL    HCT 41.5 37.5 - 51.0 %    MCV 91 79 - 97 fL    MCH 31.0 26.6 - 33.0 pg    MCHC 34.2 31.5 - 35.7 g/dL    RDW 12.5 11.6 - 15.4 %    Platelet Count 262 150 - 450 x10E3/uL    Neutrophils 49 Not Estab. %    Lymphocytes 36 Not Estab. %    Monocytes 11 Not Estab. %    Eosinophils 3 Not Estab. %    Basophils PCT 1 Not Estab. %    Neutrophils (Absolute) 2.7 1.4 - 7.0 x10E3/uL    Lymphocytes (Absolute) 1.9 0.7 - 3.1 x10E3/uL    Monocytes (Absolute) 0.6 0.1 - 0.9 x10E3/uL    Eosinophils (Absolute) 0.2 0.0 - 0.4 x10E3/uL    Basophils ABS 0.0 0.0 - 0.2 x10E3/uL    Immature Granulocytes 0 Not Estab. %    Immature Granulocytes (Absolute) 0.0 0.0 - 0.1 x10E3/uL   Comprehensive metabolic panel    Collection Time: 04/12/25  7:56 AM   Result Value Ref Range    Glucose, Random 106 (H) 70 - 99 mg/dL    BUN 14 6 - 24 mg/dL    Creatinine 0.70 (L) 0.76 - 1.27 mg/dL    eGFR 108 >59 mL/min/1.73    SL AMB BUN/CREATININE RATIO 20 9 - 20    Sodium 138 134 - 144 mmol/L    Potassium 4.9 3.5 - 5.2 mmol/L    Chloride 102 96 - 106 mmol/L    CO2 18 (L) 20 - 29 mmol/L    CALCIUM 9.4 8.7 - 10.2 mg/dL    Protein, Total 7.1 6.0 - 8.5 g/dL    Albumin 4.5 3.8 - 4.9 g/dL    Globulin, Total 2.6 1.5 - 4.5 g/dL    TOTAL BILIRUBIN 1.1 0.0 - 1.2 mg/dL    Alk Phos Isoenzymes 64 44 - 121 IU/L    AST 23 0 - 40 IU/L    ALT 16 0 - 44 IU/L   Urinalysis with microscopic    Collection Time: 04/12/25  7:56 AM   Result Value Ref Range    Specific Gravity 1.016 1.005 - 1.030    Ph 5.5 5.0 - 7.5    Color UA Yellow Yellow    Urine Appearance Clear Clear    Leukocyte Esterase Negative Negative    Protein Negative Negative/Trace    Glucose, 24 HR Urine Negative Negative    Ketone, Urine Negative Negative    Blood, Urine Negative Negative    Bilirubin, Urine Negative Negative    Urobilinogen Urine 0.2 0.2 - 1.0 mg/dL     Nitrites Urine Negative Negative    Microscopic Examination Comment     Microscopic Examination See below:    Microscopic Examination    Collection Time: 04/12/25  7:56 AM   Result Value Ref Range    SL AMB WBC, URINE None seen 0 - 5 /hpf    RBC, Urine 0-2 0 - 2 /hpf    Epithelial Cells (non renal) None seen 0 - 10 /hpf    Casts None seen None seen /lpf    Bacteria, Urine None seen None seen/Few   Lipid panel    Collection Time: 04/12/25  7:56 AM   Result Value Ref Range    Cholesterol, Total 94 (L) 100 - 199 mg/dL    Triglycerides 97 0 - 149 mg/dL    HDL 38 (L) >39 mg/dL    VLDL Cholesterol Calculated 19 5 - 40 mg/dL    LDL Calculated 37 0 - 99 mg/dL   Albumin / creatinine urine ratio    Collection Time: 04/12/25  7:56 AM   Result Value Ref Range    Creatinine, Urine 83.3 Not Estab. mg/dL    Albumin,U,Random <3.0 Not Estab. ug/mL    Microalb/Creat Ratio <4 0 - 29 mg/g creat   Hemoglobin A1c (w/out EAG)    Collection Time: 04/12/25  7:56 AM   Result Value Ref Range    Hemoglobin A1C 6.5 (H) 4.8 - 5.6 %        Physical Exam  Constitutional:       General: He is not in acute distress.     Appearance: Normal appearance.   HENT:      Head: Normocephalic and atraumatic.      Nose: Nose normal.      Mouth/Throat:      Mouth: Mucous membranes are moist.   Eyes:      Extraocular Movements: Extraocular movements intact.      Pupils: Pupils are equal, round, and reactive to light.   Cardiovascular:      Rate and Rhythm: Normal rate and regular rhythm.      Pulses: Normal pulses.      Heart sounds: Normal heart sounds. No murmur heard.     No friction rub.   Pulmonary:      Effort: Pulmonary effort is normal. No respiratory distress.      Breath sounds: Normal breath sounds. No wheezing.   Abdominal:      General: Abdomen is flat. Bowel sounds are normal. There is no distension.      Palpations: Abdomen is soft. There is no mass.      Tenderness: There is no abdominal tenderness. There is no guarding.   Musculoskeletal:          General: Tenderness present. Normal range of motion.      Cervical back: Normal range of motion and neck supple.      Comments: My tenderness of the medial collateral ligament of the knee   Skin:     General: Skin is warm.   Neurological:      General: No focal deficit present.      Mental Status: He is alert and oriented to person, place, and time. Mental status is at baseline.      Cranial Nerves: No cranial nerve deficit.   Psychiatric:         Mood and Affect: Mood normal.         Behavior: Behavior normal.

## 2025-04-28 ENCOUNTER — TELEPHONE (OUTPATIENT)
Age: 55
End: 2025-04-28

## 2025-04-28 NOTE — TELEPHONE ENCOUNTER
PA for Diclofenac Sodium (VOLTAREN) 1 % SUBMITTED to [facility]    via    []CMM-KEY:   [x]Surescripts-Case ID #: [id number]   []Availity-Auth ID #  []Faxed to plan   []Other website   []Phone call Case ID #     []PA sent as URGENT    All office notes, labs and other pertaining documents and studies sent. Clinical questions answered. Awaiting determination from insurance company.     Turnaround time for your insurance to make a decision on your Prior Authorization can take 7-21 business days.

## 2025-04-29 NOTE — TELEPHONE ENCOUNTER
PA for Diclofenac Sodium (VOLTAREN) 1 % DENIED    Reason:(Screenshot if applicable)        Message sent to office clinical pool Yes    Denial letter scanned into Media Yes    Appeal started No (Provider will need to decide if appeal is warranted and send clinical documentation to Prior Authorization Team for initiation.)    **Please follow up with your patient regarding denial and next steps**